# Patient Record
Sex: FEMALE
[De-identification: names, ages, dates, MRNs, and addresses within clinical notes are randomized per-mention and may not be internally consistent; named-entity substitution may affect disease eponyms.]

---

## 2019-04-11 PROBLEM — Z00.00 ENCOUNTER FOR PREVENTIVE HEALTH EXAMINATION: Status: ACTIVE | Noted: 2019-04-11

## 2019-04-25 ENCOUNTER — APPOINTMENT (OUTPATIENT)
Dept: ENDOCRINOLOGY | Facility: CLINIC | Age: 64
End: 2019-04-25
Payer: COMMERCIAL

## 2019-04-25 ENCOUNTER — LABORATORY RESULT (OUTPATIENT)
Age: 64
End: 2019-04-25

## 2019-04-25 VITALS
SYSTOLIC BLOOD PRESSURE: 123 MMHG | HEART RATE: 76 BPM | DIASTOLIC BLOOD PRESSURE: 72 MMHG | WEIGHT: 113 LBS | OXYGEN SATURATION: 98 % | HEIGHT: 61.42 IN | TEMPERATURE: 97.6 F | BODY MASS INDEX: 21.06 KG/M2

## 2019-04-25 DIAGNOSIS — Z82.49 FAMILY HISTORY OF ISCHEMIC HEART DISEASE AND OTHER DISEASES OF THE CIRCULATORY SYSTEM: ICD-10-CM

## 2019-04-25 DIAGNOSIS — Z87.891 PERSONAL HISTORY OF NICOTINE DEPENDENCE: ICD-10-CM

## 2019-04-25 DIAGNOSIS — Z86.39 PERSONAL HISTORY OF OTHER ENDOCRINE, NUTRITIONAL AND METABOLIC DISEASE: ICD-10-CM

## 2019-04-25 DIAGNOSIS — Z87.898 PERSONAL HISTORY OF OTHER SPECIFIED CONDITIONS: ICD-10-CM

## 2019-04-25 PROCEDURE — 99204 OFFICE O/P NEW MOD 45 MIN: CPT | Mod: 25

## 2019-04-25 PROCEDURE — 36415 COLL VENOUS BLD VENIPUNCTURE: CPT

## 2019-04-25 RX ORDER — MELATONIN 3 MG
TABLET ORAL
Refills: 0 | Status: ACTIVE | COMMUNITY

## 2019-04-25 RX ORDER — METHIMAZOLE 10 MG/1
10 TABLET ORAL
Refills: 0 | Status: ACTIVE | COMMUNITY

## 2019-04-25 NOTE — CONSULT LETTER
[Consult Letter:] : I had the pleasure of evaluating your patient, [unfilled]. [Dear  ___] : Dear  [unfilled], [Consult Closing:] : Thank you very much for allowing me to participate in the care of this patient.  If you have any questions, please do not hesitate to contact me. [Please see my note below.] : Please see my note below. [Sincerely,] : Sincerely,

## 2019-04-25 NOTE — HISTORY OF PRESENT ILLNESS
[FreeTextEntry1] : CC: Hyperthyroidism\par This is a 64-year-old female with hyperthyroidism, osteoporosis, thyroid nodules, here for consultation. She reports that she was diagnosed with hyperthyroidism at the age of 60. She saw Dr. Rosas who prescribed methimazole 5 mg daily at that time. She is currently on methimazole 10 mg daily. She reports noncompliance with methimazole and is unsure of how long she has been taking them. She is unsure of the etiology of her hyperthyroidism. She reports occasional palpitations and weight loss. She denies sore throat, fever, pruritus, change in school or urine. She has been offered ZIMMER in the past but refused.\par She reports that she underwent FNA of 2 thyroid nodules appx 2 years and that they were benign. \par She had a recent bone mineral density report which revealed severe osteoporosis. She has never been on treatment for osteoporosis.

## 2019-04-25 NOTE — ASSESSMENT
[FreeTextEntry1] : Marybeth is a 64-year-old female with hyperthyroidism, thyroid nodules, severe osteoporosis.\par 1.Hyperthyroidism\par -Due to Graves' disease versus toxic nodule. Will check TSI antibody. \par -Hyperthyroidism is uncontrolled based on recent blood work. Increase methimazole to 10 mg 3 times a day. Side effects including agranulocytosis, liver failure, rash reviewed with the patient.\par -Definitive treatment of hyperthyroidism including ZIMMER and thyroidectomy reviewed with the patient and she refuses both.\par 2.Thyroid nodules\par -She reports she had two FNA in the past which were benign.\par -FNA results requested from Dr. Rosas.\par -Check her sonogram.\par 3.Severe Osteoporosis\par -Risk factors include postmenopausal thin  female with hyperthyroidism and history of smoking\par -Will check for secondary causes of osteoporosis and start treatment after results were reviewed.\par  [Methimazole Therapy] : Risks and benefits of methimazole therapy were discussed with the patient,  including rash, liver dysfunction, and agranulocytosis.  Patient was instructed to call the office for flu-like symptoms eg fever and sore-throat

## 2019-04-25 NOTE — REVIEW OF SYSTEMS
[Recent Weight Loss (___ Lbs)] : recent [unfilled] ~Ulb weight loss [Palpitations] : palpitations [All other systems negative] : All other systems negative

## 2019-04-25 NOTE — PHYSICAL EXAM
[Alert] : alert [Normal Voice/Communication] : normal voice communication [No Acute Distress] : no acute distress [Normal Sclera/Conjunctiva] : normal sclera/conjunctiva [No Proptosis] : no proptosis [No Lid Lag] : no lid lag [Supple] : the neck was supple [Normal Oropharynx] : the oropharynx was normal [No Respiratory Distress] : no respiratory distress [Normal Rate and Effort] : normal respiratory rhythm and effort [No Accessory Muscle Use] : no accessory muscle use [Clear to Auscultation] : lungs were clear to auscultation bilaterally [Normal Palpation] : palpation of the chest revealed no abnormalities [Normal Rate] : heart rate was normal  [Normal S1, S2] : normal S1 and S2 [No Edema] : there was no peripheral edema [Regular Rhythm] : with a regular rhythm [Not Distended] : not distended [No Spinal Tenderness] : no spinal tenderness [No Stigmata of Cushings Syndrome] : no stigmata of cushings syndrome [Normal Gait] : normal gait [Normal Insight/Judgement] : insight and judgment were intact [Normal Affect] : the affect was normal [de-identified] : thin  [Normal Mood] : the mood was normal [de-identified] : thyromegaly and palpable nodules bilateral  [de-identified] : temporal wasting

## 2019-04-26 LAB
25(OH)D3 SERPL-MCNC: 22.5 NG/ML
M PROTEIN SPEC IFE-MCNC: NORMAL
PROLACTIN SERPL-MCNC: 7.1 NG/ML
THYROGLOB AB SERPL-ACNC: 150 IU/ML
THYROPEROXIDASE AB SERPL IA-ACNC: 486 IU/ML

## 2019-04-29 LAB
CALCIUM SERPL-MCNC: 9.5 MG/DL
PARATHYROID HORMONE INTACT: 62 PG/ML
TSI ACT/NOR SER: 109 IU/L

## 2019-05-01 LAB
CAU: 4 MG/DL
CREAT 24H UR-MCNC: 0.6 G/24 H
CREAT ?TM UR-MCNC: 49 MG/DL
IGA 24H UR QL IFE: NORMAL
PROT ?TM UR-MCNC: 24 HR
SPECIMEN VOL 24H UR: 1325 ML
SPECIMEN VOL 24H UR: 53 MG/24 H

## 2019-05-02 LAB
CELIAC DISEASE INTERPRETATION: NORMAL
CELIAC GENE PAIRS PRESENT: NO
DQ ALPHA 1: NORMAL
DQ BETA 1: NORMAL
IMMUNOGLOBULIN A (IGA): 236 MG/DL

## 2019-05-08 LAB
CORTIS 24H UR-MCNC: 24 H
CORTIS 24H UR-MRATE: 24 MCG/24 H
SPECIMEN VOL 24H UR: 1325 ML

## 2019-05-09 ENCOUNTER — APPOINTMENT (OUTPATIENT)
Dept: ENDOCRINOLOGY | Facility: CLINIC | Age: 64
End: 2019-05-09
Payer: COMMERCIAL

## 2019-05-09 VITALS
OXYGEN SATURATION: 98 % | WEIGHT: 113 LBS | DIASTOLIC BLOOD PRESSURE: 60 MMHG | SYSTOLIC BLOOD PRESSURE: 110 MMHG | TEMPERATURE: 98.2 F | HEIGHT: 61.42 IN | BODY MASS INDEX: 21.06 KG/M2 | HEART RATE: 78 BPM

## 2019-05-09 PROCEDURE — 99214 OFFICE O/P EST MOD 30 MIN: CPT | Mod: 25

## 2019-05-09 PROCEDURE — 36415 COLL VENOUS BLD VENIPUNCTURE: CPT

## 2019-05-09 NOTE — HISTORY OF PRESENT ILLNESS
[Vitamin D (supplements)] : Vitamin D as a dietary supplement [Low Vit D Intake/Exposure] : low vitamin D intake [FreeTextEntry1] : CC: Hyperthyroidism\par This is a 64-year-old female with hyperthyroidism due to Graves Disease, goiter, osteoporosis, vitamin d insufficiency, here for follow up. She reports that she was diagnosed with hyperthyroidism at the age of 60. She saw Dr. Rosas who prescribed methimazole 5 mg daily at that time. She is currently on methimazole 10 mg 3 x day daily. She has a history of noncompliance with methimazole and is unsure of how long she has been taking them. She reports sore throat but denies fever, pruritus, change in school or urine.\par She underwent FNA of 1 thyroid nodules appx 2 years and the result was apparently benign. \par She had a recent bone mineral density report which revealed severe osteoporosis. She has never been on treatment for osteoporosis.

## 2019-05-09 NOTE — PHYSICAL EXAM
[Alert] : alert [Normal Voice/Communication] : normal voice communication [No Acute Distress] : no acute distress [Normal Sclera/Conjunctiva] : normal sclera/conjunctiva [No Proptosis] : no proptosis [No Lid Lag] : no lid lag [Normal Oropharynx] : the oropharynx was normal [No Respiratory Distress] : no respiratory distress [Normal Rate and Effort] : normal respiratory rhythm and effort [No Accessory Muscle Use] : no accessory muscle use [Normal Rate] : heart rate was normal  [No Edema] : there was no peripheral edema [Not Distended] : not distended [No Spinal Tenderness] : no spinal tenderness [No Stigmata of Cushings Syndrome] : no stigmata of cushings syndrome [Normal Gait] : normal gait [Normal Insight/Judgement] : insight and judgment were intact [Normal Affect] : the affect was normal [Normal Mood] : the mood was normal [de-identified] : thin  [de-identified] : temporal wasting  [de-identified] : thyromegaly, nodules bilateral

## 2019-05-09 NOTE — ASSESSMENT
[FreeTextEntry1] : This is a 64-year-old female with hyperthyroidism due to Graves Disease, multinodular goiter, Hashimoto's thyroiditis, severe osteoporosis, vitamin D insufficiency.\par 1.Hyperthyroidism\par Due to Graves' disease +/- MNG\par Continue methimazole 10 mg 3 times a day for now. Side effects including agranulocytosis, liver failure, rash reviewed with the patient. Check CBC, CMP, TFTs today.\par -Definitive treatment of hyperthyroidism including ZIMMER and thyroidectomy reviewed with the patient in detail (yesterday and today) and she refuses both. I stressed the importance of controlling her hyperthyroidism since untreated may lead to cardiac arrhythmias and death. She still does not wish to proceed with ZIMMER or thyroidectomy. She states "whatever happens, happens". Complications of multinodular goiter including obstructive symptoms and compression of the trachea/esophagus were reviewed.\par 2.MNG\par She reports she had FNA in the past which was benign.\par FNA results requested from Dr. Rosas.\par 3.Severe Osteoporosis\par Risk factors include postmenopausal thin  female with hyperthyroidism and history of smoking\par Treatment options were reviewed.I would prefer to initiate Forteo however patient refuses and is willing to consider Prolia. Will see if covered by insurance.\par

## 2019-05-10 LAB
ALBUMIN SERPL ELPH-MCNC: 4.2 G/DL
ALP BLD-CCNC: 208 U/L
ALT SERPL-CCNC: 28 U/L
ANION GAP SERPL CALC-SCNC: 13 MMOL/L
AST SERPL-CCNC: 23 U/L
BASOPHILS # BLD AUTO: 0.1 K/UL
BASOPHILS NFR BLD AUTO: 1.6 %
BILIRUB SERPL-MCNC: 0.4 MG/DL
BUN SERPL-MCNC: 11 MG/DL
CALCIUM SERPL-MCNC: 9.2 MG/DL
CHLORIDE SERPL-SCNC: 107 MMOL/L
CO2 SERPL-SCNC: 21 MMOL/L
CREAT SERPL-MCNC: 0.6 MG/DL
EOSINOPHIL # BLD AUTO: 0.2 K/UL
EOSINOPHIL NFR BLD AUTO: 3.2 %
GLUCOSE SERPL-MCNC: 97 MG/DL
HCT VFR BLD CALC: 41.3 %
HGB BLD-MCNC: 12.1 G/DL
IMM GRANULOCYTES NFR BLD AUTO: 0.2 %
LYMPHOCYTES # BLD AUTO: 2.37 K/UL
LYMPHOCYTES NFR BLD AUTO: 38.2 %
MAN DIFF?: NORMAL
MCHC RBC-ENTMCNC: 25 PG
MCHC RBC-ENTMCNC: 29.3 GM/DL
MCV RBC AUTO: 85.3 FL
MONOCYTES # BLD AUTO: 0.72 K/UL
MONOCYTES NFR BLD AUTO: 11.6 %
NEUTROPHILS # BLD AUTO: 2.8 K/UL
NEUTROPHILS NFR BLD AUTO: 45.2 %
PLATELET # BLD AUTO: 303 K/UL
POTASSIUM SERPL-SCNC: 5 MMOL/L
PROT SERPL-MCNC: 6.9 G/DL
RBC # BLD: 4.84 M/UL
RBC # FLD: 17.1 %
SODIUM SERPL-SCNC: 141 MMOL/L
T3 SERPL-MCNC: 198 NG/DL
T4 FREE SERPL-MCNC: 1.5 NG/DL
TSH SERPL-ACNC: <0.01 UIU/ML
WBC # FLD AUTO: 6.2 K/UL

## 2019-05-22 ENCOUNTER — RESULT REVIEW (OUTPATIENT)
Age: 64
End: 2019-05-22

## 2019-05-30 ENCOUNTER — APPOINTMENT (OUTPATIENT)
Dept: ENDOCRINOLOGY | Facility: CLINIC | Age: 64
End: 2019-05-30
Payer: COMMERCIAL

## 2019-05-30 VITALS
HEIGHT: 61.42 IN | DIASTOLIC BLOOD PRESSURE: 62 MMHG | WEIGHT: 113 LBS | TEMPERATURE: 98.1 F | BODY MASS INDEX: 21.06 KG/M2 | SYSTOLIC BLOOD PRESSURE: 110 MMHG | OXYGEN SATURATION: 98 % | HEART RATE: 89 BPM

## 2019-05-30 DIAGNOSIS — E55.9 VITAMIN D DEFICIENCY, UNSPECIFIED: ICD-10-CM

## 2019-05-30 DIAGNOSIS — E06.3 AUTOIMMUNE THYROIDITIS: ICD-10-CM

## 2019-05-30 PROCEDURE — 36415 COLL VENOUS BLD VENIPUNCTURE: CPT

## 2019-05-30 PROCEDURE — 99214 OFFICE O/P EST MOD 30 MIN: CPT | Mod: 25

## 2019-05-30 NOTE — ASSESSMENT
[FreeTextEntry1] : This is a 64-year-old female with hyperthyroidism due to Graves Disease, multinodular goiter, Hashimoto's thyroiditis, severe osteoporosis, vitamin D insufficiency.\par 1.Hyperthyroidism\par Due to Graves' disease +/- MNG\par Continue methimazole 10 mg 3 times a day for now. Side effects including agranulocytosis, liver failure, rash reviewed with the patient. Check CBC, CMP, TFTs today.\par Definitive treatment of hyperthyroidism including ZIMMER and thyroidectomy reviewed with the patient in detail  and she refuses both. \par Complications of multinodular goiter including obstructive symptoms and compression of the trachea/esophagus were reviewed.\par 2.MNG\par Atypia of undetermined significance which was noted on right 1.5 cm horizontal and left 2.6 cm thyroid nodule reviewed with the patient. We are currently awaiting molecular analysis of these nodules.\par 3.Severe Osteoporosis\par Risk factors include postmenopausal thin  female with hyperthyroidism and history of smoking\par Treatment options were reviewed.I would prefer to initiate Forteo however patient refuses and is willing to consider Prolia.\par  [Methimazole Therapy] : Risks and benefits of methimazole therapy were discussed with the patient,  including rash, liver dysfunction, and agranulocytosis.  Patient was instructed to call the office for flu-like symptoms eg fever and sore-throat [Denosumab Therapy] : Risks  and benefits of denosumab therapy were discussed with the patient including eczema, cellulitis, osteonecrosis of the jaw and atypical femur fractures

## 2019-05-30 NOTE — HISTORY OF PRESENT ILLNESS
[FreeTextEntry1] : CC: Hyperthyroidism\par This is a 64-year-old female with hyperthyroidism due to Graves Disease, multinodular goiter, osteoporosis, vitamin d insufficiency, here for follow up. \par She reports that she was diagnosed with hyperthyroidism at the age of 60. She saw Dr. Rosas who prescribed methimazole 5 mg daily at that time. She is currently on methimazole 10 mg 3 x day daily. She has a history of noncompliance with methimazole and is unsure of how long she has been taking it. She denies sore throat, fever, pruritus, change in school or urine.\par She underwent FNA of 1 thyroid nodules appx 2 years and the result was apparently benign. \par She had a recent bone mineral density report which revealed severe osteoporosis. She has never been on treatment for osteoporosis.\par \par 5/22/19:\par FNA right 1.7 cm thyroid nodule: Benign ([place] II)\par FNA right 1.5 cm thyroid nodule: Atypia of undetermined significance ([place] III)\par FNA left 2.6 cm thyroid nodule: Atypia of undetermined significance ([place] III)\par

## 2019-05-30 NOTE — PHYSICAL EXAM
[Alert] : alert [No Acute Distress] : no acute distress [Normal Voice/Communication] : normal voice communication [Normal Sclera/Conjunctiva] : normal sclera/conjunctiva [No Proptosis] : no proptosis [No Lid Lag] : no lid lag [Normal Oropharynx] : the oropharynx was normal [No Respiratory Distress] : no respiratory distress [Normal Rate and Effort] : normal respiratory rhythm and effort [No Accessory Muscle Use] : no accessory muscle use [Normal Rate] : heart rate was normal  [No Edema] : there was no peripheral edema [Not Distended] : not distended [No Spinal Tenderness] : no spinal tenderness [No Stigmata of Cushings Syndrome] : no stigmata of cushings syndrome [Normal Gait] : normal gait [Normal Insight/Judgement] : insight and judgment were intact [Normal Affect] : the affect was normal [Normal Mood] : the mood was normal [de-identified] : thin  [de-identified] : temporal wasting  [de-identified] : thyromegaly, nodules bilateral

## 2019-05-31 LAB
ALBUMIN SERPL ELPH-MCNC: 4.2 G/DL
ALP BLD-CCNC: 253 U/L
ALT SERPL-CCNC: 20 U/L
ANION GAP SERPL CALC-SCNC: 10 MMOL/L
AST SERPL-CCNC: 18 U/L
BASOPHILS # BLD AUTO: 0.11 K/UL
BASOPHILS NFR BLD AUTO: 1.7 %
BILIRUB SERPL-MCNC: 0.4 MG/DL
BUN SERPL-MCNC: 14 MG/DL
CALCIUM SERPL-MCNC: 9.9 MG/DL
CHLORIDE SERPL-SCNC: 106 MMOL/L
CO2 SERPL-SCNC: 24 MMOL/L
CREAT SERPL-MCNC: 0.69 MG/DL
EOSINOPHIL # BLD AUTO: 0.2 K/UL
EOSINOPHIL NFR BLD AUTO: 3 %
GLUCOSE SERPL-MCNC: 102 MG/DL
HCT VFR BLD CALC: 43.7 %
HGB BLD-MCNC: 12.9 G/DL
IMM GRANULOCYTES NFR BLD AUTO: 0.2 %
LYMPHOCYTES # BLD AUTO: 2.68 K/UL
LYMPHOCYTES NFR BLD AUTO: 40.4 %
MAN DIFF?: NORMAL
MCHC RBC-ENTMCNC: 25.7 PG
MCHC RBC-ENTMCNC: 29.5 GM/DL
MCV RBC AUTO: 87.2 FL
MONOCYTES # BLD AUTO: 0.54 K/UL
MONOCYTES NFR BLD AUTO: 8.1 %
NEUTROPHILS # BLD AUTO: 3.09 K/UL
NEUTROPHILS NFR BLD AUTO: 46.6 %
PLATELET # BLD AUTO: 289 K/UL
POTASSIUM SERPL-SCNC: 4.7 MMOL/L
PROT SERPL-MCNC: 7.3 G/DL
RBC # BLD: 5.01 M/UL
RBC # FLD: 17.8 %
SODIUM SERPL-SCNC: 140 MMOL/L
T3 SERPL-MCNC: 170 NG/DL
T4 FREE SERPL-MCNC: 1.1 NG/DL
TSH SERPL-ACNC: <0.01 UIU/ML
WBC # FLD AUTO: 6.63 K/UL

## 2019-06-01 LAB — GGT SERPL-CCNC: 20 U/L

## 2019-06-04 LAB — ALP BONE SERPL-MCNC: 98 MCG/L

## 2019-06-14 ENCOUNTER — APPOINTMENT (OUTPATIENT)
Dept: ENDOCRINOLOGY | Facility: CLINIC | Age: 64
End: 2019-06-14
Payer: COMMERCIAL

## 2019-06-14 VITALS
WEIGHT: 113 LBS | SYSTOLIC BLOOD PRESSURE: 121 MMHG | DIASTOLIC BLOOD PRESSURE: 68 MMHG | HEART RATE: 78 BPM | OXYGEN SATURATION: 98 % | TEMPERATURE: 98 F | BODY MASS INDEX: 21.06 KG/M2 | HEIGHT: 61.42 IN

## 2019-06-14 DIAGNOSIS — M81.0 AGE-RELATED OSTEOPOROSIS W/OUT CURRENT PATHOLOGICAL FRACTURE: ICD-10-CM

## 2019-06-14 DIAGNOSIS — E05.00 THYROTOXICOSIS WITH DIFFUSE GOITER W/OUT THYROTOXIC CRISIS OR STORM: ICD-10-CM

## 2019-06-14 DIAGNOSIS — E05.90 THYROTOXICOSIS, UNSPECIFIED W/OUT THYROTOXIC CRISIS OR STORM: ICD-10-CM

## 2019-06-14 DIAGNOSIS — E04.2 NONTOXIC MULTINODULAR GOITER: ICD-10-CM

## 2019-06-14 PROCEDURE — 99214 OFFICE O/P EST MOD 30 MIN: CPT | Mod: 25

## 2019-06-14 PROCEDURE — 36415 COLL VENOUS BLD VENIPUNCTURE: CPT

## 2019-06-14 PROCEDURE — 96401 CHEMO ANTI-NEOPL SQ/IM: CPT | Mod: 59

## 2019-06-14 RX ORDER — DENOSUMAB 60 MG/ML
60 INJECTION SUBCUTANEOUS
Refills: 0 | Status: ACTIVE | COMMUNITY
Start: 2019-06-14

## 2019-06-14 RX ORDER — METHIMAZOLE 10 MG/1
10 TABLET ORAL DAILY
Qty: 100 | Refills: 0 | Status: ACTIVE | COMMUNITY
Start: 2019-06-14 | End: 1900-01-01

## 2019-06-14 RX ORDER — DENOSUMAB 60 MG/ML
60 INJECTION SUBCUTANEOUS
Qty: 1 | Refills: 0 | Status: COMPLETED | OUTPATIENT
Start: 2019-06-14

## 2019-06-14 RX ADMIN — DENOSUMAB 0 MG/ML: 60 INJECTION SUBCUTANEOUS at 00:00

## 2019-06-14 NOTE — ASSESSMENT
[FreeTextEntry1] : This is a 64-year-old female with hyperthyroidism due to Graves Disease, multinodular goiter, Hashimoto's thyroiditis, severe osteoporosis, vitamin D insufficiency.\par 1.Hyperthyroidism\par Due to Graves' disease +/- MNG\par Continue methimazole 10 mg daily for now. Check CBC, CMP, TFTs today.\par Definitive treatment of hyperthyroidism including ZIMMER and thyroidectomy reviewed with the patient in detail  and she refuses both. \par Complications of multinodular goiter including obstructive symptoms and compression of the trachea/esophagus were reviewed.\par Will adjust methimazole if needed. \par 2.MNG\par She does not want to proceed with thyroidectomy. \par Check thyroid sonogram in 11/2019. \par 3.Severe Osteoporosis\par Risk factors include postmenopausal thin  female with hyperthyroidism and history of smoking\par Prolia administered in left arm without incident.  [Methimazole Therapy] : Risks and benefits of methimazole therapy were discussed with the patient,  including rash, liver dysfunction, and agranulocytosis.  Patient was instructed to call the office for flu-like symptoms eg fever and sore-throat [Denosumab Therapy] : Risks  and benefits of denosumab therapy were discussed with the patient including eczema, cellulitis, osteonecrosis of the jaw and atypical femur fractures

## 2019-06-14 NOTE — PHYSICAL EXAM
[Alert] : alert [No Acute Distress] : no acute distress [Normal Voice/Communication] : normal voice communication [Normal Sclera/Conjunctiva] : normal sclera/conjunctiva [No Proptosis] : no proptosis [No Lid Lag] : no lid lag [Normal Oropharynx] : the oropharynx was normal [No Respiratory Distress] : no respiratory distress [Normal Rate and Effort] : normal respiratory rhythm and effort [No Accessory Muscle Use] : no accessory muscle use [Normal Rate] : heart rate was normal  [Not Distended] : not distended [No Spinal Tenderness] : no spinal tenderness [Spine Straight] : spine straight [No Stigmata of Cushings Syndrome] : no stigmata of cushings syndrome [Normal Gait] : normal gait [No Clubbing, Cyanosis] : no clubbing  or cyanosis of the fingernails [No Involuntary Movements] : no involuntary movements were seen [No Tremors] : no tremors [Normal Insight/Judgement] : insight and judgment were intact [Normal Affect] : the affect was normal [Normal Mood] : the mood was normal [de-identified] : thin  [de-identified] : temporal wasting  [de-identified] : thyromegaly, nodules bilateral

## 2019-06-14 NOTE — PHYSICAL EXAM
[Alert] : alert [No Acute Distress] : no acute distress [Normal Voice/Communication] : normal voice communication [Normal Sclera/Conjunctiva] : normal sclera/conjunctiva [No Proptosis] : no proptosis [No Lid Lag] : no lid lag [No Respiratory Distress] : no respiratory distress [Normal Oropharynx] : the oropharynx was normal [Normal Rate and Effort] : normal respiratory rhythm and effort [No Accessory Muscle Use] : no accessory muscle use [Normal Rate] : heart rate was normal  [Not Distended] : not distended [No Spinal Tenderness] : no spinal tenderness [Spine Straight] : spine straight [No Stigmata of Cushings Syndrome] : no stigmata of cushings syndrome [Normal Gait] : normal gait [No Clubbing, Cyanosis] : no clubbing  or cyanosis of the fingernails [No Involuntary Movements] : no involuntary movements were seen [No Tremors] : no tremors [Normal Insight/Judgement] : insight and judgment were intact [Normal Affect] : the affect was normal [Normal Mood] : the mood was normal [de-identified] : thin  [de-identified] : temporal wasting  [de-identified] : thyromegaly, nodules bilateral

## 2019-06-14 NOTE — HISTORY OF PRESENT ILLNESS
[Prolia (Denosumab)] : Prolia (Denosumab) [FreeTextEntry1] : CC: Hyperthyroidism/Prolia\par This is a 64-year-old female with hyperthyroidism due to grief disease, multinodular goiter, osteoporosis, vitamin D insufficiency, here for followup and first Prolia injection.\par Hyperthyroidism that was diagnosed at the age of 60. She currently takes methimazole 10 mg daily because she reports fatigue with increased doses. She is unsure of how long she has been on methimazole total because she reports noncompliance with methimazole in the past.\par There is no sore throat, fever, change in stool or urine color, rash.\par She has never been on treatment for her severe osteoporosis in the past.\par \par 5/22/19:\par FNA right 1.7 cm thyroid nodule: Benign ([place] II)\par FNA right 1.5 cm thyroid nodule: Atypia of undetermined significance ([place] III); molecular panel negative.\par FNA left 2.6 cm thyroid nodule: Atypia of undetermined significance ([place] III); molecular panel negative

## 2019-06-14 NOTE — HISTORY OF PRESENT ILLNESS
[Prolia (Denosumab)] : Prolia (Denosumab) [FreeTextEntry1] : CC: Hyperthyroidism/Prolia\par This is a 64-year-old female with hyperthyroidism due to grief disease, multinodular goiter, osteoporosis, vitamin D insufficiency, here for followup and first Prolia injection.\par Hyperthyroidism that was diagnosed at the age of 60. She currently takes methimazole 10 mg daily because she reports fatigue with increased doses. She is unsure of how long she has been on methimazole total because she reports noncompliance with methimazole in the past.\par There is no sore throat, fever, change in stool or urine color, rash.\par She has never been on treatment for her severe osteoporosis in the past.\par \par 5/22/19:\par FNA right 1.7 cm thyroid nodule: Benign ([place] II)\par FNA right 1.5 cm thyroid nodule: Atypia of undetermined significance ([place] III); molecular panel negative.\par FNA left 2.6 cm thyroid nodule: Atypia of undetermined significance ([place] III); molecular panel negative

## 2019-06-17 LAB
ALBUMIN SERPL ELPH-MCNC: 4.2 G/DL
ALP BLD-CCNC: 240 U/L
ALT SERPL-CCNC: 17 U/L
ANION GAP SERPL CALC-SCNC: 12 MMOL/L
AST SERPL-CCNC: 19 U/L
BASOPHILS # BLD AUTO: 0.1 K/UL
BASOPHILS NFR BLD AUTO: 1.6 %
BILIRUB SERPL-MCNC: 0.4 MG/DL
BUN SERPL-MCNC: 15 MG/DL
CALCIUM SERPL-MCNC: 9.5 MG/DL
CHLORIDE SERPL-SCNC: 107 MMOL/L
CO2 SERPL-SCNC: 22 MMOL/L
CREAT SERPL-MCNC: 0.81 MG/DL
EOSINOPHIL # BLD AUTO: 0.16 K/UL
EOSINOPHIL NFR BLD AUTO: 2.6 %
GLUCOSE SERPL-MCNC: 94 MG/DL
HCT VFR BLD CALC: 45 %
HGB BLD-MCNC: 13.1 G/DL
IMM GRANULOCYTES NFR BLD AUTO: 0.2 %
LYMPHOCYTES # BLD AUTO: 2.64 K/UL
LYMPHOCYTES NFR BLD AUTO: 42.9 %
MAN DIFF?: NORMAL
MCHC RBC-ENTMCNC: 26.4 PG
MCHC RBC-ENTMCNC: 29.1 GM/DL
MCV RBC AUTO: 90.5 FL
MONOCYTES # BLD AUTO: 0.46 K/UL
MONOCYTES NFR BLD AUTO: 7.5 %
NEUTROPHILS # BLD AUTO: 2.78 K/UL
NEUTROPHILS NFR BLD AUTO: 45.2 %
PLATELET # BLD AUTO: 253 K/UL
POTASSIUM SERPL-SCNC: 4.5 MMOL/L
PROT SERPL-MCNC: 7.1 G/DL
RBC # BLD: 4.97 M/UL
RBC # FLD: 17.8 %
SODIUM SERPL-SCNC: 141 MMOL/L
T3 SERPL-MCNC: 215 NG/DL
T4 FREE SERPL-MCNC: 1.2 NG/DL
WBC # FLD AUTO: 6.15 K/UL

## 2019-06-20 LAB — TSH SERPL-ACNC: <0.01 UIU/ML

## 2019-11-07 ENCOUNTER — APPOINTMENT (OUTPATIENT)
Dept: ENDOCRINOLOGY | Facility: CLINIC | Age: 64
End: 2019-11-07

## 2019-12-13 ENCOUNTER — APPOINTMENT (OUTPATIENT)
Dept: ENDOCRINOLOGY | Facility: CLINIC | Age: 64
End: 2019-12-13